# Patient Record
Sex: FEMALE | Race: WHITE | NOT HISPANIC OR LATINO | URBAN - METROPOLITAN AREA
[De-identification: names, ages, dates, MRNs, and addresses within clinical notes are randomized per-mention and may not be internally consistent; named-entity substitution may affect disease eponyms.]

---

## 2017-06-07 ENCOUNTER — EMERGENCY (EMERGENCY)
Facility: HOSPITAL | Age: 52
LOS: 1 days | Discharge: HOME | End: 2017-06-07
Attending: EMERGENCY MEDICINE | Admitting: EMERGENCY MEDICINE
Payer: MEDICARE

## 2017-06-07 VITALS
SYSTOLIC BLOOD PRESSURE: 142 MMHG | HEART RATE: 71 BPM | TEMPERATURE: 98 F | OXYGEN SATURATION: 97 % | RESPIRATION RATE: 16 BRPM | DIASTOLIC BLOOD PRESSURE: 81 MMHG

## 2017-06-07 PROCEDURE — 99283 EMERGENCY DEPT VISIT LOW MDM: CPT

## 2017-06-07 RX ORDER — DOCUSATE SODIUM 100 MG
1 CAPSULE ORAL
Qty: 8 | Refills: 0 | OUTPATIENT
Start: 2017-06-07 | End: 2017-06-11

## 2017-06-07 RX ORDER — ACETAMINOPHEN 500 MG
1000 TABLET ORAL ONCE
Qty: 0 | Refills: 0 | Status: DISCONTINUED | OUTPATIENT
Start: 2017-06-07 | End: 2017-06-07

## 2017-06-07 RX ORDER — OXYCODONE HYDROCHLORIDE 5 MG/1
5 TABLET ORAL ONCE
Qty: 0 | Refills: 0 | Status: DISCONTINUED | OUTPATIENT
Start: 2017-06-07 | End: 2017-06-07

## 2017-06-07 RX ORDER — SODIUM CHLORIDE 9 MG/ML
1000 INJECTION INTRAMUSCULAR; INTRAVENOUS; SUBCUTANEOUS ONCE
Qty: 0 | Refills: 0 | Status: DISCONTINUED | OUTPATIENT
Start: 2017-06-07 | End: 2017-06-07

## 2017-06-07 NOTE — ED PROVIDER NOTE - PLAN OF CARE
1. You may take the prescribed laxative as directed, Colace 100mg twice a day as needed for constipation.   2. Follow up with your Primary Care Physician as soon as possible for further evaluation.   3. Return to the Emergency Department for any concerning symptoms.

## 2017-06-07 NOTE — ED PROVIDER NOTE - CARE PLAN
Principal Discharge DX:	Constipation  Instructions for follow-up, activity and diet:	1. You may take the prescribed laxative as directed, Colace 100mg twice a day as needed for constipation.   2. Follow up with your Primary Care Physician as soon as possible for further evaluation.   3. Return to the Emergency Department for any concerning symptoms.

## 2017-06-07 NOTE — ED PROVIDER NOTE - OBJECTIVE STATEMENT
52 y.o. female 52 y.o. female p/w constipation. Patient states she attempted to have a bowel movement 2 hours ago, was unable to fully push the stool out. She reports the sensation of a large mass of stool in the rectum with severe pain in the rectum. She denies blood in the stool, headaches, dizziness, abdominal pain.

## 2017-06-07 NOTE — ED PROVIDER NOTE - MEDICAL DECISION MAKING DETAILS
Ingrid:  pt had large BM in the bathroom and feels better, abdomen soft, NT, pt would like to go home.

## 2017-06-07 NOTE — ED ADULT NURSE NOTE - OBJECTIVE STATEMENT
Pt B/B EMS with the C/O hard stool stuck at rectum & pt unable to push & in severe pain. Pt is screaming with pain and not allowing to examine the MD to evaluate the  rectum . Pt states she wants to give one more try in ed by going to the rest room. In the Rest room pt evacuated large amount of soft stool & she is feeling better abdomen soft & had no rectal bleed. Pt discharged

## 2020-01-07 ENCOUNTER — APPOINTMENT (OUTPATIENT)
Dept: UROGYNECOLOGY | Facility: CLINIC | Age: 55
End: 2020-01-07
Payer: MEDICARE

## 2020-01-07 VITALS
SYSTOLIC BLOOD PRESSURE: 120 MMHG | WEIGHT: 253 LBS | HEART RATE: 78 BPM | HEIGHT: 65.5 IN | DIASTOLIC BLOOD PRESSURE: 87 MMHG | BODY MASS INDEX: 41.65 KG/M2

## 2020-01-07 DIAGNOSIS — Z80.0 FAMILY HISTORY OF MALIGNANT NEOPLASM OF DIGESTIVE ORGANS: ICD-10-CM

## 2020-01-07 DIAGNOSIS — Z80.1 FAMILY HISTORY OF MALIGNANT NEOPLASM OF TRACHEA, BRONCHUS AND LUNG: ICD-10-CM

## 2020-01-07 DIAGNOSIS — K57.90 DIVERTICULOSIS OF INTESTINE, PART UNSPECIFIED, W/OUT PERFORATION OR ABSCESS W/OUT BLEEDING: ICD-10-CM

## 2020-01-07 DIAGNOSIS — Z80.42 FAMILY HISTORY OF MALIGNANT NEOPLASM OF PROSTATE: ICD-10-CM

## 2020-01-07 DIAGNOSIS — Z78.9 OTHER SPECIFIED HEALTH STATUS: ICD-10-CM

## 2020-01-07 DIAGNOSIS — R35.1 NOCTURIA: ICD-10-CM

## 2020-01-07 DIAGNOSIS — E66.9 OBESITY, UNSPECIFIED: ICD-10-CM

## 2020-01-07 DIAGNOSIS — Z83.49 FAMILY HISTORY OF OTHER ENDOCRINE, NUTRITIONAL AND METABOLIC DISEASES: ICD-10-CM

## 2020-01-07 DIAGNOSIS — Z85.828 PERSONAL HISTORY OF OTHER MALIGNANT NEOPLASM OF SKIN: ICD-10-CM

## 2020-01-07 DIAGNOSIS — Z82.5 FAMILY HISTORY OF ASTHMA AND OTHER CHRONIC LOWER RESPIRATORY DISEASES: ICD-10-CM

## 2020-01-07 DIAGNOSIS — F32.9 MAJOR DEPRESSIVE DISORDER, SINGLE EPISODE, UNSPECIFIED: ICD-10-CM

## 2020-01-07 DIAGNOSIS — K59.00 CONSTIPATION, UNSPECIFIED: ICD-10-CM

## 2020-01-07 DIAGNOSIS — K46.9 UNSPECIFIED ABDOMINAL HERNIA W/OUT OBSTRUCTION OR GANGRENE: ICD-10-CM

## 2020-01-07 DIAGNOSIS — N39.3 STRESS INCONTINENCE (FEMALE) (MALE): ICD-10-CM

## 2020-01-07 DIAGNOSIS — N89.8 OTHER SPECIFIED NONINFLAMMATORY DISORDERS OF VAGINA: ICD-10-CM

## 2020-01-07 LAB
BILIRUB UR QL STRIP: NORMAL
CLARITY UR: CLEAR
COLLECTION METHOD: NORMAL
GLUCOSE UR-MCNC: NORMAL
HCG UR QL: 0.2 EU/DL
HGB UR QL STRIP.AUTO: NORMAL
KETONES UR-MCNC: NORMAL
LEUKOCYTE ESTERASE UR QL STRIP: NORMAL
NITRITE UR QL STRIP: NORMAL
PH UR STRIP: 5.5
PROT UR STRIP-MCNC: NORMAL
SP GR UR STRIP: 1.02

## 2020-01-07 PROCEDURE — 99204 OFFICE O/P NEW MOD 45 MIN: CPT | Mod: 25

## 2020-01-07 PROCEDURE — 51701 INSERT BLADDER CATHETER: CPT

## 2020-01-07 NOTE — HISTORY OF PRESENT ILLNESS
[Urinary Frequency] : none [Rectal Prolapse] : frequent [Unable To Restrain Bowel Movement] : rare [Feelings Of Urinary Urgency] : none [x3+] : three or more  times a night [Urinary Tract Infection] : none [Constipation Obstructed Defecation] : frequent [] : years ago [de-identified] : with full bladder only [FreeTextEntry1] : \par \par PMH: diverticulitis, constipation, obesity, basal cell carcinoma, depression\par PSH: total hysterectomy and BSO (hyperplasia w/ atypia) 2007, appendectomy\par Social History: , retired, nonsmoker\par \par daily fluid intake: 2.5-3L water, 2-3 cans cream soda

## 2020-01-07 NOTE — PHYSICAL EXAM
[No Acute Distress] : in no acute distress [Oriented x3] : oriented to person, place, and time [Normal Memory] : ~T memory was ~L unimpaired [Normal Mood/Affect] : mood and affect are normal [Warm and Dry] : was warm and dry to touch [Normal Gait] : gait was normal [Labia Minora] : were normal [Labia Majora] : were normal [Normal] : no abnormalities [No Bleeding] : there was no active vaginal bleeding [Absent] : absent [Obese] : obese [Tenderness] : ~T no ~M abdominal tenderness observed [Distended] : not distended [H/Smegaly] : no hepatosplenomegaly [Inguinal LAD] : no adenopathy was noted in the inguinal lymph nodes [FreeTextEntry4] : hypertrophic vaginal tissue present, on stalk. Exam extremely limited due to patient discomfort.  [de-identified] : Exam extremely limited due to patient discomfort. Prolapsed tissue more consistent with hypertrophic vaginal tissue/vaginal mass vs prolapse

## 2020-01-07 NOTE — DISCUSSION/SUMMARY
[FreeTextEntry1] : \par Exam findings reviewed. Recommend she see GYN Dr Vasques for evaluation. Prolapse tissue on exam with h/o hyperplasia with atypia. Recommend excision/biopsy. Will see GYN ASAP and follow up with me as needed. We discussed possibility of needing exam under anesthesia due to significant discomfort during todays exam. \par \par We reviewed behavioral and fluid modifications for her nocturia and urinary symptoms. PVR normal. She is s/p normal cystoscopy 1-2 years ago by urology as per patient. All questions answered.

## 2020-01-08 PROBLEM — E66.9 OBESITY: Status: ACTIVE | Noted: 2020-01-08

## 2020-01-08 PROBLEM — Z80.1 FAMILY HISTORY OF LUNG CANCER: Status: ACTIVE | Noted: 2020-01-08

## 2020-01-08 PROBLEM — Z85.828 HISTORY OF BASAL CELL CARCINOMA: Status: RESOLVED | Noted: 2020-01-08 | Resolved: 2020-01-08

## 2020-01-08 PROBLEM — Z80.0 FAMILY HISTORY OF LIVER CANCER: Status: ACTIVE | Noted: 2020-01-08

## 2020-01-08 PROBLEM — Z78.9 RARELY CONSUMES ALCOHOL: Status: ACTIVE | Noted: 2020-01-08

## 2020-01-08 PROBLEM — Z82.5 FAMILY HISTORY OF ASTHMA: Status: ACTIVE | Noted: 2020-01-08

## 2020-01-08 PROBLEM — K57.90 DIVERTICULOSIS: Status: ACTIVE | Noted: 2020-01-08

## 2020-01-08 PROBLEM — F32.9 DEPRESSION: Status: ACTIVE | Noted: 2020-01-08

## 2020-01-08 PROBLEM — Z78.9 NON-SMOKER: Status: ACTIVE | Noted: 2020-01-08

## 2020-01-08 PROBLEM — Z80.0 FAMILY HISTORY OF ESOPHAGEAL CARCINOMA: Status: ACTIVE | Noted: 2020-01-08

## 2020-01-08 PROBLEM — K46.9 HERNIA: Status: ACTIVE | Noted: 2020-01-08

## 2020-01-08 PROBLEM — K59.00 CONSTIPATION: Status: ACTIVE | Noted: 2020-01-08

## 2020-01-08 PROBLEM — Z83.49 FAMILY HISTORY OF OBESITY: Status: ACTIVE | Noted: 2020-01-08

## 2020-01-08 PROBLEM — Z80.42 FAMILY HISTORY OF MALIGNANT NEOPLASM OF PROSTATE: Status: ACTIVE | Noted: 2020-01-08

## 2020-01-08 RX ORDER — BUSPIRONE HYDROCHLORIDE 15 MG/1
15 TABLET ORAL
Refills: 0 | Status: ACTIVE | COMMUNITY

## 2020-01-08 RX ORDER — ESCITALOPRAM OXALATE 5 MG/1
TABLET, FILM COATED ORAL
Refills: 0 | Status: ACTIVE | COMMUNITY

## 2020-01-08 RX ORDER — CLONAZEPAM 2 MG/1
TABLET ORAL
Refills: 0 | Status: ACTIVE | COMMUNITY

## 2020-01-08 RX ORDER — ESZOPICLONE 3 MG/1
3 TABLET, FILM COATED ORAL
Refills: 0 | Status: ACTIVE | COMMUNITY

## 2020-01-08 RX ORDER — OMEPRAZOLE 40 MG/1
CAPSULE, DELAYED RELEASE ORAL
Refills: 0 | Status: ACTIVE | COMMUNITY

## 2020-03-04 ENCOUNTER — RESULT REVIEW (OUTPATIENT)
Age: 55
End: 2020-03-04